# Patient Record
Sex: FEMALE | Race: WHITE | NOT HISPANIC OR LATINO | URBAN - METROPOLITAN AREA
[De-identification: names, ages, dates, MRNs, and addresses within clinical notes are randomized per-mention and may not be internally consistent; named-entity substitution may affect disease eponyms.]

---

## 2021-11-15 ENCOUNTER — SKIN CHECK (OUTPATIENT)
Dept: URBAN - METROPOLITAN AREA CLINIC 94 | Facility: CLINIC | Age: 46
Setting detail: DERMATOLOGY
End: 2021-11-15

## 2021-11-15 DIAGNOSIS — D48.5 NEOPLASM OF UNCERTAIN BEHAVIOR OF SKIN: ICD-10-CM

## 2021-11-15 PROCEDURE — 99213 OFFICE O/P EST LOW 20 MIN: CPT

## 2021-11-15 PROCEDURE — 11102 TANGNTL BX SKIN SINGLE LES: CPT

## 2021-11-29 ENCOUNTER — ED & C (OUTPATIENT)
Dept: URBAN - METROPOLITAN AREA CLINIC 94 | Facility: CLINIC | Age: 46
Setting detail: DERMATOLOGY
End: 2021-11-29

## 2021-11-29 DIAGNOSIS — L81.4 OTHER MELANIN HYPERPIGMENTATION: ICD-10-CM

## 2021-11-29 PROCEDURE — 17262 DSTRJ MAL LES T/A/L 1.1-2.0: CPT

## 2021-12-13 ENCOUNTER — PDT (OUTPATIENT)
Dept: URBAN - METROPOLITAN AREA CLINIC 94 | Facility: CLINIC | Age: 46
Setting detail: DERMATOLOGY
End: 2021-12-13

## 2021-12-13 DIAGNOSIS — C44.229 SQUAMOUS CELL CARCINOMA OF SKIN OF LEFT EAR AND EXTERNAL AURICULAR CANAL: ICD-10-CM

## 2021-12-13 PROCEDURE — 96573 PDT DSTR PRMLG LES PHYS/QHP: CPT

## 2022-02-28 ENCOUNTER — COMPLETE SKIN EXAM (OUTPATIENT)
Dept: URBAN - METROPOLITAN AREA CLINIC 94 | Facility: CLINIC | Age: 47
Setting detail: DERMATOLOGY
End: 2022-02-28

## 2022-02-28 DIAGNOSIS — C44.311 BASAL CELL CARCINOMA OF SKIN OF NOSE: ICD-10-CM

## 2022-02-28 PROCEDURE — 99213 OFFICE O/P EST LOW 20 MIN: CPT

## 2022-05-20 ENCOUNTER — COMPLETE SKIN EXAM (OUTPATIENT)
Dept: URBAN - METROPOLITAN AREA CLINIC 94 | Facility: CLINIC | Age: 47
Setting detail: DERMATOLOGY
End: 2022-05-20

## 2022-05-20 DIAGNOSIS — L57.0 ACTINIC KERATOSIS: ICD-10-CM

## 2022-05-20 PROCEDURE — 11102 TANGNTL BX SKIN SINGLE LES: CPT

## 2022-05-20 PROCEDURE — 99213 OFFICE O/P EST LOW 20 MIN: CPT

## 2022-05-24 ENCOUNTER — CONSULT (OUTPATIENT)
Dept: URBAN - METROPOLITAN AREA CLINIC 94 | Facility: CLINIC | Age: 47
Setting detail: DERMATOLOGY
End: 2022-05-24

## 2022-05-24 DIAGNOSIS — L82.0 INFLAMED SEBORRHEIC KERATOSIS: ICD-10-CM

## 2022-05-24 PROCEDURE — OTHER COSMETIC CONSULT: OTHER

## 2022-05-31 ENCOUNTER — CHEMICAL PEEL (OUTPATIENT)
Dept: URBAN - METROPOLITAN AREA CLINIC 94 | Facility: CLINIC | Age: 47
Setting detail: DERMATOLOGY
End: 2022-05-31

## 2022-05-31 DIAGNOSIS — D48.5 NEOPLASM OF UNCERTAIN BEHAVIOR OF SKIN: ICD-10-CM

## 2022-05-31 PROCEDURE — OTHER ZO 3 STEP PEEL: OTHER

## 2022-06-06 ENCOUNTER — ED & C (OUTPATIENT)
Dept: URBAN - METROPOLITAN AREA CLINIC 94 | Facility: CLINIC | Age: 47
Setting detail: DERMATOLOGY
End: 2022-06-06

## 2022-06-06 DIAGNOSIS — L57.8 OTHER SKIN CHANGES DUE TO CHRONIC EXPOSURE TO NONIONIZING RADIATION: ICD-10-CM

## 2022-06-06 DIAGNOSIS — L90.5 SCAR CONDITIONS AND FIBROSIS OF SKIN: ICD-10-CM

## 2022-06-06 DIAGNOSIS — Z85.828 PERSONAL HISTORY OF OTHER MALIGNANT NEOPLASM OF SKIN: ICD-10-CM

## 2022-06-06 DIAGNOSIS — D04.39 CARCINOMA IN SITU OF SKIN OF OTHER PARTS OF FACE: ICD-10-CM

## 2022-06-06 PROCEDURE — 17262 DSTRJ MAL LES T/A/L 1.1-2.0: CPT

## 2022-06-07 ENCOUNTER — CHEMICAL PEEL (OUTPATIENT)
Dept: URBAN - METROPOLITAN AREA CLINIC 94 | Facility: CLINIC | Age: 47
Setting detail: DERMATOLOGY
End: 2022-06-07

## 2022-06-07 DIAGNOSIS — D48.5 NEOPLASM OF UNCERTAIN BEHAVIOR OF SKIN: ICD-10-CM

## 2022-06-07 DIAGNOSIS — R20.9 UNSPECIFIED DISTURBANCES OF SKIN SENSATION: ICD-10-CM

## 2022-06-07 PROCEDURE — OTHER ZO 3 STEP PEEL: OTHER

## 2022-06-14 ENCOUNTER — CHEMICAL PEEL (OUTPATIENT)
Dept: URBAN - METROPOLITAN AREA CLINIC 94 | Facility: CLINIC | Age: 47
Setting detail: DERMATOLOGY
End: 2022-06-14

## 2022-06-14 DIAGNOSIS — D04.4 CARCINOMA IN SITU OF SKIN OF SCALP AND NECK: ICD-10-CM

## 2022-06-14 PROCEDURE — OTHER ZO 3 STEP PEEL: OTHER

## 2022-06-21 ENCOUNTER — CHEMICAL PEEL (OUTPATIENT)
Dept: URBAN - METROPOLITAN AREA CLINIC 94 | Facility: CLINIC | Age: 47
Setting detail: DERMATOLOGY
End: 2022-06-21

## 2022-06-21 DIAGNOSIS — C44.311 BASAL CELL CARCINOMA OF SKIN OF NOSE: ICD-10-CM

## 2022-06-21 PROCEDURE — OTHER ZO 3 STEP PEEL: OTHER

## 2022-07-01 ENCOUNTER — OTHER- (OUTPATIENT)
Dept: URBAN - METROPOLITAN AREA CLINIC 94 | Facility: CLINIC | Age: 47
Setting detail: DERMATOLOGY
End: 2022-07-01

## 2022-07-01 DIAGNOSIS — D48.5 NEOPLASM OF UNCERTAIN BEHAVIOR OF SKIN: ICD-10-CM

## 2022-07-01 PROCEDURE — 9921N OFFICE VISIT - NO CHARGE: CPT

## 2022-09-30 ENCOUNTER — APPOINTMENT (OUTPATIENT)
Dept: URBAN - METROPOLITAN AREA CLINIC 209 | Age: 47
Setting detail: DERMATOLOGY
End: 2022-09-30

## 2022-09-30 DIAGNOSIS — L82.1 OTHER SEBORRHEIC KERATOSIS: ICD-10-CM

## 2022-09-30 DIAGNOSIS — L57.8 OTHER SKIN CHANGES DUE TO CHRONIC EXPOSURE TO NONIONIZING RADIATION: ICD-10-CM

## 2022-09-30 DIAGNOSIS — D22 MELANOCYTIC NEVI: ICD-10-CM

## 2022-09-30 DIAGNOSIS — D18.0 HEMANGIOMA: ICD-10-CM

## 2022-09-30 PROBLEM — D22.5 MELANOCYTIC NEVI OF TRUNK: Status: ACTIVE | Noted: 2022-09-30

## 2022-09-30 PROBLEM — D18.01 HEMANGIOMA OF SKIN AND SUBCUTANEOUS TISSUE: Status: ACTIVE | Noted: 2022-09-30

## 2022-09-30 PROCEDURE — OTHER COUNSELING: OTHER

## 2022-09-30 PROCEDURE — 99213 OFFICE O/P EST LOW 20 MIN: CPT

## 2022-09-30 ASSESSMENT — LOCATION SIMPLE DESCRIPTION DERM
LOCATION SIMPLE: LOWER BACK
LOCATION SIMPLE: UPPER BACK

## 2022-09-30 ASSESSMENT — LOCATION ZONE DERM: LOCATION ZONE: TRUNK

## 2022-09-30 ASSESSMENT — LOCATION DETAILED DESCRIPTION DERM
LOCATION DETAILED: SUPERIOR LUMBAR SPINE
LOCATION DETAILED: SUPERIOR THORACIC SPINE
LOCATION DETAILED: INFERIOR THORACIC SPINE

## 2023-03-31 ENCOUNTER — APPOINTMENT (OUTPATIENT)
Dept: URBAN - METROPOLITAN AREA CLINIC 209 | Age: 48
Setting detail: DERMATOLOGY
End: 2023-03-31

## 2023-03-31 DIAGNOSIS — B07.8 OTHER VIRAL WARTS: ICD-10-CM

## 2023-03-31 DIAGNOSIS — L57.8 OTHER SKIN CHANGES DUE TO CHRONIC EXPOSURE TO NONIONIZING RADIATION: ICD-10-CM

## 2023-03-31 DIAGNOSIS — D18.0 HEMANGIOMA: ICD-10-CM

## 2023-03-31 DIAGNOSIS — L82.1 OTHER SEBORRHEIC KERATOSIS: ICD-10-CM

## 2023-03-31 DIAGNOSIS — D22 MELANOCYTIC NEVI: ICD-10-CM

## 2023-03-31 PROBLEM — D18.01 HEMANGIOMA OF SKIN AND SUBCUTANEOUS TISSUE: Status: ACTIVE | Noted: 2023-03-31

## 2023-03-31 PROBLEM — D22.5 MELANOCYTIC NEVI OF TRUNK: Status: ACTIVE | Noted: 2023-03-31

## 2023-03-31 PROCEDURE — 17110 DESTRUCT B9 LESION 1-14: CPT

## 2023-03-31 PROCEDURE — OTHER MIPS QUALITY: OTHER

## 2023-03-31 PROCEDURE — OTHER LIQUID NITROGEN: OTHER

## 2023-03-31 PROCEDURE — 99213 OFFICE O/P EST LOW 20 MIN: CPT | Mod: 25

## 2023-03-31 PROCEDURE — OTHER COUNSELING: OTHER

## 2023-03-31 ASSESSMENT — LOCATION SIMPLE DESCRIPTION DERM
LOCATION SIMPLE: UPPER BACK
LOCATION SIMPLE: RIGHT CALF
LOCATION SIMPLE: LOWER BACK

## 2023-03-31 ASSESSMENT — LOCATION DETAILED DESCRIPTION DERM
LOCATION DETAILED: RIGHT DISTAL LATERAL CALF
LOCATION DETAILED: SUPERIOR LUMBAR SPINE
LOCATION DETAILED: INFERIOR THORACIC SPINE
LOCATION DETAILED: SUPERIOR THORACIC SPINE

## 2023-03-31 ASSESSMENT — LOCATION ZONE DERM
LOCATION ZONE: LEG
LOCATION ZONE: TRUNK

## 2023-03-31 NOTE — PROCEDURE: LIQUID NITROGEN
Medical Necessity Information: It is in your best interest to select a reason for this procedure from the list below. All of these items fulfill various CMS LCD requirements except the new and changing color options.
Detail Level: Detailed
Medical Necessity Clause: This procedure was medically necessary because the lesions that were treated were:
Consent: The patient's consent was obtained including but not limited to risks of crusting, scabbing, blistering, scarring, darker or lighter pigmentary change, recurrence, incomplete removal and infection.
Show Topical Anesthesia Variable?: Yes
Spray Paint Text: The liquid nitrogen was applied to the skin utilizing a spray paint frosting technique.
Post-Care Instructions: I reviewed with the patient in detail post-care instructions. Patient is to wear sunprotection, and avoid picking at any of the treated lesions. Pt may apply Vaseline to crusted or scabbing areas.
Spray Paint Technique: No
Number Of Freeze-Thaw Cycles: 1 freeze-thaw cycle

## 2023-05-01 ENCOUNTER — APPOINTMENT (OUTPATIENT)
Dept: URBAN - METROPOLITAN AREA CLINIC 209 | Age: 48
Setting detail: DERMATOLOGY
End: 2023-05-01

## 2023-05-01 DIAGNOSIS — B07.8 OTHER VIRAL WARTS: ICD-10-CM

## 2023-05-01 PROCEDURE — OTHER MIPS QUALITY: OTHER

## 2023-05-01 PROCEDURE — 17110 DESTRUCT B9 LESION 1-14: CPT

## 2023-05-01 PROCEDURE — OTHER LIQUID NITROGEN: OTHER

## 2023-05-01 PROCEDURE — OTHER COUNSELING: OTHER

## 2023-05-01 ASSESSMENT — LOCATION DETAILED DESCRIPTION DERM
LOCATION DETAILED: RIGHT LATERAL POSTERIOR ANKLE
LOCATION DETAILED: RIGHT DISTAL LATERAL CALF
LOCATION DETAILED: RIGHT MEDIAL PROXIMAL PRETIBIAL REGION
LOCATION DETAILED: LEFT MEDIAL POSTERIOR ANKLE
LOCATION DETAILED: RIGHT DISTAL PRETIBIAL REGION
LOCATION DETAILED: RIGHT MEDIAL DISTAL PRETIBIAL REGION
LOCATION DETAILED: RIGHT LATERAL DISTAL CALF
LOCATION DETAILED: RIGHT DISTAL LATERAL PRETIBIAL REGION

## 2023-05-01 ASSESSMENT — LOCATION SIMPLE DESCRIPTION DERM
LOCATION SIMPLE: RIGHT CALF
LOCATION SIMPLE: RIGHT LOWER LEG
LOCATION SIMPLE: RIGHT ANKLE
LOCATION SIMPLE: LEFT ANKLE
LOCATION SIMPLE: RIGHT PRETIBIAL REGION

## 2023-05-01 ASSESSMENT — LOCATION ZONE DERM: LOCATION ZONE: LEG

## 2023-06-01 ENCOUNTER — APPOINTMENT (OUTPATIENT)
Dept: URBAN - METROPOLITAN AREA CLINIC 209 | Age: 48
Setting detail: DERMATOLOGY
End: 2023-06-01

## 2023-06-01 DIAGNOSIS — B07.8 OTHER VIRAL WARTS: ICD-10-CM

## 2023-06-01 PROCEDURE — OTHER LIQUID NITROGEN: OTHER

## 2023-06-01 PROCEDURE — 17110 DESTRUCT B9 LESION 1-14: CPT

## 2023-06-01 PROCEDURE — OTHER MIPS QUALITY: OTHER

## 2023-06-01 PROCEDURE — OTHER COUNSELING: OTHER

## 2023-06-01 ASSESSMENT — LOCATION DETAILED DESCRIPTION DERM
LOCATION DETAILED: LEFT DISTAL PRETIBIAL REGION
LOCATION DETAILED: RIGHT LATERAL POSTERIOR ANKLE
LOCATION DETAILED: RIGHT DISTAL LATERAL PRETIBIAL REGION
LOCATION DETAILED: LEFT PROXIMAL PRETIBIAL REGION
LOCATION DETAILED: RIGHT MEDIAL DISTAL PRETIBIAL REGION
LOCATION DETAILED: RIGHT MEDIAL PROXIMAL PRETIBIAL REGION
LOCATION DETAILED: LEFT LATERAL DISTAL PRETIBIAL REGION
LOCATION DETAILED: LEFT MEDIAL POSTERIOR ANKLE
LOCATION DETAILED: LEFT LATERAL POSTERIOR ANKLE

## 2023-06-01 ASSESSMENT — LOCATION SIMPLE DESCRIPTION DERM
LOCATION SIMPLE: RIGHT LOWER LEG
LOCATION SIMPLE: RIGHT ANKLE
LOCATION SIMPLE: LEFT PRETIBIAL REGION
LOCATION SIMPLE: LEFT ANKLE
LOCATION SIMPLE: RIGHT PRETIBIAL REGION

## 2023-06-01 ASSESSMENT — LOCATION ZONE DERM: LOCATION ZONE: LEG

## 2024-06-17 ENCOUNTER — APPOINTMENT (OUTPATIENT)
Dept: URBAN - METROPOLITAN AREA CLINIC 209 | Age: 49
Setting detail: DERMATOLOGY
End: 2024-06-17

## 2024-06-17 DIAGNOSIS — D22 MELANOCYTIC NEVI: ICD-10-CM

## 2024-06-17 DIAGNOSIS — D485 NEOPLASM OF UNCERTAIN BEHAVIOR OF SKIN: ICD-10-CM

## 2024-06-17 DIAGNOSIS — L82.1 OTHER SEBORRHEIC KERATOSIS: ICD-10-CM

## 2024-06-17 DIAGNOSIS — L57.8 OTHER SKIN CHANGES DUE TO CHRONIC EXPOSURE TO NONIONIZING RADIATION: ICD-10-CM

## 2024-06-17 DIAGNOSIS — D18.0 HEMANGIOMA: ICD-10-CM

## 2024-06-17 PROBLEM — D48.5 NEOPLASM OF UNCERTAIN BEHAVIOR OF SKIN: Status: ACTIVE | Noted: 2024-06-17

## 2024-06-17 PROBLEM — D22.5 MELANOCYTIC NEVI OF TRUNK: Status: ACTIVE | Noted: 2024-06-17

## 2024-06-17 PROBLEM — D18.01 HEMANGIOMA OF SKIN AND SUBCUTANEOUS TISSUE: Status: ACTIVE | Noted: 2024-06-17

## 2024-06-17 PROCEDURE — 99213 OFFICE O/P EST LOW 20 MIN: CPT | Mod: 25

## 2024-06-17 PROCEDURE — OTHER BIOPSY BY SHAVE METHOD: OTHER

## 2024-06-17 PROCEDURE — OTHER COUNSELING: OTHER

## 2024-06-17 PROCEDURE — OTHER MIPS QUALITY: OTHER

## 2024-06-17 PROCEDURE — 11102 TANGNTL BX SKIN SINGLE LES: CPT

## 2024-06-17 ASSESSMENT — LOCATION SIMPLE DESCRIPTION DERM
LOCATION SIMPLE: UPPER BACK
LOCATION SIMPLE: RIGHT UPPER BACK
LOCATION SIMPLE: LOWER BACK

## 2024-06-17 ASSESSMENT — LOCATION ZONE DERM: LOCATION ZONE: TRUNK

## 2024-06-17 ASSESSMENT — LOCATION DETAILED DESCRIPTION DERM
LOCATION DETAILED: SUPERIOR THORACIC SPINE
LOCATION DETAILED: RIGHT LATERAL UPPER BACK
LOCATION DETAILED: SUPERIOR LUMBAR SPINE
LOCATION DETAILED: INFERIOR THORACIC SPINE

## 2024-06-17 NOTE — PROCEDURE: BIOPSY BY SHAVE METHOD
Detail Level: Detailed
Depth Of Biopsy: dermis
Was A Bandage Applied: Yes
Size Of Lesion In Cm: 0
Biopsy Type: H and E
Biopsy Method: Dermablade
Anesthesia Type: 1% lidocaine with epinephrine
Anesthesia Volume In Cc: 0.5
Hemostasis: Drysol
Wound Care: Petrolatum
Dressing: bandage
Destruction After The Procedure: No
Type Of Destruction Used: Curettage
Curettage Text: The wound bed was treated with curettage after the biopsy was performed.
Cryotherapy Text: The wound bed was treated with cryotherapy after the biopsy was performed.
Electrodesiccation Text: The wound bed was treated with electrodesiccation after the biopsy was performed.
Electrodesiccation And Curettage Text: The wound bed was treated with electrodesiccation and curettage after the biopsy was performed.
Silver Nitrate Text: The wound bed was treated with silver nitrate after the biopsy was performed.
Lab: [id number]
Consent: Written or verbal consent was obtained and risks were reviewed including but not limited to scarring, infection, bleeding, scabbing, incomplete removal, nerve damage and allergy to anesthesia.
Post-Care Instructions: I reviewed with the patient in detail post-care instructions. Patient is to keep the biopsy site dry overnight, and then apply bacitracin twice daily until healed. Patient may apply hydrogen peroxide soaks to remove any crusting.
Notification Instructions: Patient will be notified of biopsy results. However, patient instructed to call the office if not contacted within 2 weeks.
Billing Type: Third-Party Bill
Information: Selecting Yes will display possible errors in your note based on the variables you have selected. This validation is only offered as a suggestion for you. PLEASE NOTE THAT THE VALIDATION TEXT WILL BE REMOVED WHEN YOU FINALIZE YOUR NOTE. IF YOU WANT TO FAX A PRELIMINARY NOTE YOU WILL NEED TO TOGGLE THIS TO 'NO' IF YOU DO NOT WANT IT IN YOUR FAXED NOTE.

## 2024-12-18 ENCOUNTER — APPOINTMENT (OUTPATIENT)
Dept: URBAN - METROPOLITAN AREA CLINIC 209 | Age: 49
Setting detail: DERMATOLOGY
End: 2025-01-01

## 2024-12-18 DIAGNOSIS — R23.3 SPONTANEOUS ECCHYMOSES: ICD-10-CM

## 2024-12-18 DIAGNOSIS — D18.0 HEMANGIOMA: ICD-10-CM

## 2024-12-18 DIAGNOSIS — L82.1 OTHER SEBORRHEIC KERATOSIS: ICD-10-CM

## 2024-12-18 DIAGNOSIS — D22 MELANOCYTIC NEVI: ICD-10-CM

## 2024-12-18 DIAGNOSIS — L57.8 OTHER SKIN CHANGES DUE TO CHRONIC EXPOSURE TO NONIONIZING RADIATION: ICD-10-CM

## 2024-12-18 PROBLEM — D22.62 MELANOCYTIC NEVI OF LEFT UPPER LIMB, INCLUDING SHOULDER: Status: ACTIVE | Noted: 2024-12-18

## 2024-12-18 PROBLEM — D18.01 HEMANGIOMA OF SKIN AND SUBCUTANEOUS TISSUE: Status: ACTIVE | Noted: 2024-12-18

## 2024-12-18 PROBLEM — D22.72 MELANOCYTIC NEVI OF LEFT LOWER LIMB, INCLUDING HIP: Status: ACTIVE | Noted: 2024-12-18

## 2024-12-18 PROBLEM — D22.61 MELANOCYTIC NEVI OF RIGHT UPPER LIMB, INCLUDING SHOULDER: Status: ACTIVE | Noted: 2024-12-18

## 2024-12-18 PROBLEM — D22.5 MELANOCYTIC NEVI OF TRUNK: Status: ACTIVE | Noted: 2024-12-18

## 2024-12-18 PROBLEM — D22.71 MELANOCYTIC NEVI OF RIGHT LOWER LIMB, INCLUDING HIP: Status: ACTIVE | Noted: 2024-12-18

## 2024-12-18 PROCEDURE — OTHER PATIENT SPECIFIC COUNSELING: OTHER

## 2024-12-18 PROCEDURE — 99213 OFFICE O/P EST LOW 20 MIN: CPT

## 2024-12-18 PROCEDURE — OTHER MIPS QUALITY: OTHER

## 2024-12-18 PROCEDURE — OTHER COUNSELING: OTHER

## 2024-12-18 ASSESSMENT — LOCATION SIMPLE DESCRIPTION DERM
LOCATION SIMPLE: LEFT FOREHEAD
LOCATION SIMPLE: CHEST
LOCATION SIMPLE: LEFT 5TH TOE
LOCATION SIMPLE: ABDOMEN
LOCATION SIMPLE: UPPER BACK
LOCATION SIMPLE: LEFT 3RD TOE
LOCATION SIMPLE: LEFT UPPER ARM
LOCATION SIMPLE: RIGHT THIGH
LOCATION SIMPLE: LEFT 4TH TOE
LOCATION SIMPLE: RIGHT FOREARM
LOCATION SIMPLE: LEFT FOOT
LOCATION SIMPLE: RIGHT UPPER ARM
LOCATION SIMPLE: LEFT PRETIBIAL REGION
LOCATION SIMPLE: RIGHT PRETIBIAL REGION
LOCATION SIMPLE: LEFT FOREARM

## 2024-12-18 ASSESSMENT — LOCATION DETAILED DESCRIPTION DERM
LOCATION DETAILED: PERIUMBILICAL SKIN
LOCATION DETAILED: RIGHT PROXIMAL PRETIBIAL REGION
LOCATION DETAILED: RIGHT PROXIMAL DORSAL FOREARM
LOCATION DETAILED: RIGHT ANTERIOR DISTAL UPPER ARM
LOCATION DETAILED: LEFT INFERIOR MEDIAL FOREHEAD
LOCATION DETAILED: INFERIOR THORACIC SPINE
LOCATION DETAILED: EPIGASTRIC SKIN
LOCATION DETAILED: LEFT DISTAL PRETIBIAL REGION
LOCATION DETAILED: RIGHT VENTRAL PROXIMAL FOREARM
LOCATION DETAILED: LEFT DORSAL 4TH TOE
LOCATION DETAILED: LEFT DORSAL FOOT
LOCATION DETAILED: RIGHT ANTERIOR DISTAL THIGH
LOCATION DETAILED: LEFT ANTERIOR DISTAL UPPER ARM
LOCATION DETAILED: LEFT PROXIMAL DORSAL FOREARM
LOCATION DETAILED: LEFT VENTRAL PROXIMAL FOREARM
LOCATION DETAILED: LEFT DORSAL 3RD TOE
LOCATION DETAILED: UPPER STERNUM
LOCATION DETAILED: LEFT DORSAL 5TH TOE
LOCATION DETAILED: LEFT PROXIMAL PRETIBIAL REGION

## 2024-12-18 ASSESSMENT — LOCATION ZONE DERM
LOCATION ZONE: FACE
LOCATION ZONE: ARM
LOCATION ZONE: TRUNK
LOCATION ZONE: FEET
LOCATION ZONE: LEG
LOCATION ZONE: TOE

## 2024-12-18 NOTE — PROCEDURE: PATIENT SPECIFIC COUNSELING
Detail Level: Simple
s/p trauma per patient\\nConsider follow up with ortho for X-ray to rule out any possible fractures.\\nDefinitely pursue X-ray if pain persists or worsens

## 2025-06-18 ENCOUNTER — APPOINTMENT (OUTPATIENT)
Dept: URBAN - METROPOLITAN AREA CLINIC 209 | Age: 50
Setting detail: DERMATOLOGY
End: 2025-06-18

## 2025-06-18 DIAGNOSIS — D18.0 HEMANGIOMA: ICD-10-CM

## 2025-06-18 DIAGNOSIS — L82.1 OTHER SEBORRHEIC KERATOSIS: ICD-10-CM

## 2025-06-18 DIAGNOSIS — L57.8 OTHER SKIN CHANGES DUE TO CHRONIC EXPOSURE TO NONIONIZING RADIATION: ICD-10-CM

## 2025-06-18 DIAGNOSIS — D22 MELANOCYTIC NEVI: ICD-10-CM

## 2025-06-18 PROBLEM — D22.61 MELANOCYTIC NEVI OF RIGHT UPPER LIMB, INCLUDING SHOULDER: Status: ACTIVE | Noted: 2025-06-18

## 2025-06-18 PROBLEM — D18.01 HEMANGIOMA OF SKIN AND SUBCUTANEOUS TISSUE: Status: ACTIVE | Noted: 2025-06-18

## 2025-06-18 PROBLEM — D22.5 MELANOCYTIC NEVI OF TRUNK: Status: ACTIVE | Noted: 2025-06-18

## 2025-06-18 PROBLEM — D22.71 MELANOCYTIC NEVI OF RIGHT LOWER LIMB, INCLUDING HIP: Status: ACTIVE | Noted: 2025-06-18

## 2025-06-18 PROBLEM — D22.72 MELANOCYTIC NEVI OF LEFT LOWER LIMB, INCLUDING HIP: Status: ACTIVE | Noted: 2025-06-18

## 2025-06-18 PROBLEM — D22.62 MELANOCYTIC NEVI OF LEFT UPPER LIMB, INCLUDING SHOULDER: Status: ACTIVE | Noted: 2025-06-18

## 2025-06-18 PROCEDURE — OTHER MIPS QUALITY: OTHER

## 2025-06-18 PROCEDURE — OTHER COUNSELING: OTHER

## 2025-06-18 PROCEDURE — 99213 OFFICE O/P EST LOW 20 MIN: CPT

## 2025-06-18 ASSESSMENT — LOCATION SIMPLE DESCRIPTION DERM
LOCATION SIMPLE: UPPER BACK
LOCATION SIMPLE: RIGHT FOREARM
LOCATION SIMPLE: RIGHT THIGH
LOCATION SIMPLE: RIGHT PRETIBIAL REGION
LOCATION SIMPLE: LEFT FOREHEAD
LOCATION SIMPLE: CHEST
LOCATION SIMPLE: LEFT UPPER ARM
LOCATION SIMPLE: LEFT FOREARM
LOCATION SIMPLE: LEFT PRETIBIAL REGION
LOCATION SIMPLE: RIGHT UPPER ARM
LOCATION SIMPLE: ABDOMEN

## 2025-06-18 ASSESSMENT — LOCATION DETAILED DESCRIPTION DERM
LOCATION DETAILED: RIGHT PROXIMAL DORSAL FOREARM
LOCATION DETAILED: RIGHT PROXIMAL PRETIBIAL REGION
LOCATION DETAILED: RIGHT VENTRAL PROXIMAL FOREARM
LOCATION DETAILED: RIGHT ANTERIOR DISTAL THIGH
LOCATION DETAILED: PERIUMBILICAL SKIN
LOCATION DETAILED: INFERIOR THORACIC SPINE
LOCATION DETAILED: LEFT ANTERIOR DISTAL UPPER ARM
LOCATION DETAILED: LEFT INFERIOR MEDIAL FOREHEAD
LOCATION DETAILED: LEFT DISTAL PRETIBIAL REGION
LOCATION DETAILED: LEFT VENTRAL PROXIMAL FOREARM
LOCATION DETAILED: UPPER STERNUM
LOCATION DETAILED: LEFT PROXIMAL DORSAL FOREARM
LOCATION DETAILED: RIGHT ANTERIOR DISTAL UPPER ARM
LOCATION DETAILED: LEFT PROXIMAL PRETIBIAL REGION
LOCATION DETAILED: EPIGASTRIC SKIN

## 2025-06-18 ASSESSMENT — LOCATION ZONE DERM
LOCATION ZONE: LEG
LOCATION ZONE: FACE
LOCATION ZONE: TRUNK
LOCATION ZONE: ARM